# Patient Record
Sex: MALE | Race: WHITE | NOT HISPANIC OR LATINO | Employment: FULL TIME | URBAN - METROPOLITAN AREA
[De-identification: names, ages, dates, MRNs, and addresses within clinical notes are randomized per-mention and may not be internally consistent; named-entity substitution may affect disease eponyms.]

---

## 2021-07-19 ENCOUNTER — OFFICE VISIT (OUTPATIENT)
Dept: SURGERY | Facility: CLINIC | Age: 56
End: 2021-07-19

## 2021-07-19 VITALS
TEMPERATURE: 98.4 F | RESPIRATION RATE: 18 BRPM | BODY MASS INDEX: 31.04 KG/M2 | OXYGEN SATURATION: 99 % | WEIGHT: 229.2 LBS | HEART RATE: 76 BPM | HEIGHT: 72 IN | DIASTOLIC BLOOD PRESSURE: 112 MMHG | SYSTOLIC BLOOD PRESSURE: 181 MMHG

## 2021-07-19 DIAGNOSIS — R10.31 INGUINODYNIA, BILATERAL: Primary | ICD-10-CM

## 2021-07-19 DIAGNOSIS — R10.32 INGUINODYNIA, BILATERAL: Primary | ICD-10-CM

## 2021-07-19 PROCEDURE — 99203 OFFICE O/P NEW LOW 30 MIN: CPT | Performed by: SURGERY

## 2021-07-19 RX ORDER — ATORVASTATIN CALCIUM 40 MG/1
TABLET, FILM COATED ORAL
COMMUNITY
Start: 2021-07-09

## 2021-07-19 RX ORDER — MINOXIDIL 2 %
SOLUTION, NON-ORAL TOPICAL
COMMUNITY

## 2021-07-19 RX ORDER — LISINOPRIL 20 MG/1
TABLET ORAL
COMMUNITY
Start: 2021-07-07

## 2021-07-21 NOTE — PROGRESS NOTES
GENERAL SURGERY CONSULTATION NOTE    Consult requested by: KAREN Tejeda    Patient Care Team:  Linda Martinez MD as PCP - General (Internal Medicine)    Reason for consult: Bilateral inguinal hernias    Subjective     Patient is a 55 y.o. male presents with concerns that he may have a recurrent bilateral inguinal hernia.  He does not report that he has had pain in his groin, but has had some minimal discomfort.  About 9 years ago, the patient underwent a laparoscopic bilateral inguinal hernia repair with open repair of his umbilical hernia.  He has not noticed any bulges, difficulties with urination, having bowel movements, or nausea or vomiting.     Review of Systems   Constitutional: Negative for appetite change, chills and fever.   HENT: Negative for congestion and sore throat.    Respiratory: Negative for cough and shortness of breath.    Cardiovascular: Negative for chest pain and palpitations.   Gastrointestinal: Negative for abdominal pain, constipation, diarrhea, nausea, vomiting and GERD.   Genitourinary: Negative for difficulty urinating, dysuria and frequency.   Musculoskeletal: Negative for arthralgias and back pain.   Skin: Negative for rash and skin lesions.   Neurological: Negative for dizziness, seizures and memory problem.   Hematological: Negative for adenopathy. Does not bruise/bleed easily.   Psychiatric/Behavioral: Negative for sleep disturbance and depressed mood.        History  Past Medical History:   Diagnosis Date   • Hyperlipidemia    • Hypertension    • Inguinal hernia      Past Surgical History:   Procedure Laterality Date   • HERNIA REPAIR  09/2012    becka ing/umb hernia     Family History   Problem Relation Age of Onset   • No Known Problems Mother    • Heart disease Father    • Hypertension Father    • No Known Problems Brother    • No Known Problems Brother      Social History     Tobacco Use   • Smoking status: Never Smoker   • Smokeless tobacco: Never Used   Substance Use Topics   •  Alcohol use: Yes     Alcohol/week: 7.0 standard drinks     Types: 7 Cans of beer per week     Comment: 1-2 a week   • Drug use: Never     (Not in a hospital admission)    Allergies:  Patient has no known allergies.    Objective     Vital Signs       Physical Exam  Vitals reviewed.   Constitutional:       Appearance: He is well-developed.   HENT:      Head: Normocephalic and atraumatic.   Eyes:      Pupils: Pupils are equal, round, and reactive to light.   Cardiovascular:      Rate and Rhythm: Normal rate and regular rhythm.   Pulmonary:      Effort: Pulmonary effort is normal.      Breath sounds: Normal breath sounds.   Abdominal:      General: There is no distension.      Palpations: Abdomen is soft.      Tenderness: There is no abdominal tenderness.      Hernia: No hernia is present. There is no hernia in the left inguinal area or right inguinal area.   Genitourinary:     Penis: Normal and circumcised.       Comments: I did not appreciate an inguinal hernia bilaterally  Musculoskeletal:         General: Normal range of motion.      Cervical back: Normal range of motion.   Lymphadenopathy:      Cervical: No cervical adenopathy.   Skin:     General: Skin is warm and dry.      Findings: No rash.   Neurological:      Mental Status: He is alert and oriented to person, place, and time.         Results Review:   Lab Results (last 24 hours)     ** No results found for the last 24 hours. **        No radiology results for the last day      I reviewed the patient's new imaging results and agree with the interpretation.  I reviewed the patient's other test results and agree with the interpretation    Assessment/Plan     Active Problems:  Bilateral inguinodynia    I do not appreciate an inguinal hernia on either side.  The patient seems to have a well-healed repair of his bilateral inguinal hernias.  The patient is asymptomatic if not minimally symptomatic from his discomfort in his groins, and therefore I would not recommend  any further imaging at this time.  If his pain gets worse, or if he notices a bulge, then I will be happy to see him back in my office for reexamination or would obtain a CT scan of the abdomen and pelvis to rule out occult hernia at that time.  Follow-up as needed    I discussed the patients findings and my recommendations with the patient.     Adithya Madera MD  07/21/21  14:34 EDT

## 2023-08-16 ENCOUNTER — TRANSCRIBE ORDERS (OUTPATIENT)
Dept: ADMINISTRATIVE | Facility: HOSPITAL | Age: 58
End: 2023-08-16
Payer: COMMERCIAL

## 2023-08-16 DIAGNOSIS — R06.09 DYSPNEA ON EXERTION: Primary | ICD-10-CM

## 2023-08-16 DIAGNOSIS — I10 HYPERTENSION, ESSENTIAL: Primary | ICD-10-CM

## 2023-09-29 ENCOUNTER — HOSPITAL ENCOUNTER (OUTPATIENT)
Dept: RESPIRATORY THERAPY | Facility: HOSPITAL | Age: 58
Discharge: HOME | End: 2023-09-29
Payer: COMMERCIAL

## 2023-09-29 ENCOUNTER — HOSPITAL ENCOUNTER (OUTPATIENT)
Dept: CARDIOLOGY | Facility: HOSPITAL | Age: 58
Discharge: HOME | End: 2023-09-29
Payer: COMMERCIAL

## 2023-09-29 VITALS
SYSTOLIC BLOOD PRESSURE: 130 MMHG | DIASTOLIC BLOOD PRESSURE: 75 MMHG | WEIGHT: 229 LBS | HEIGHT: 72 IN | BODY MASS INDEX: 31.02 KG/M2

## 2023-09-29 DIAGNOSIS — R06.09 DYSPNEA ON EXERTION: ICD-10-CM

## 2023-09-29 DIAGNOSIS — I10 HYPERTENSION, ESSENTIAL: ICD-10-CM

## 2023-09-29 LAB
BH CV ECHO MEAS - ACS: 1.78 CM
BH CV ECHO MEAS - AO MAX PG: 11.2 MMHG
BH CV ECHO MEAS - AO MEAN PG: 6.3 MMHG
BH CV ECHO MEAS - AO ROOT DIAM: 3 CM
BH CV ECHO MEAS - AO V2 MAX: 167.3 CM/SEC
BH CV ECHO MEAS - AO V2 VTI: 31.7 CM
BH CV ECHO MEAS - AVA(I,D): 1.66 CM2
BH CV ECHO MEAS - EDV(CUBED): 107.4 ML
BH CV ECHO MEAS - EDV(MOD-SP4): 80.5 ML
BH CV ECHO MEAS - EF(MOD-BP): 56 %
BH CV ECHO MEAS - EF(MOD-SP4): 56.3 %
BH CV ECHO MEAS - ESV(CUBED): 30.4 ML
BH CV ECHO MEAS - ESV(MOD-SP4): 35.2 ML
BH CV ECHO MEAS - FS: 34.4 %
BH CV ECHO MEAS - IVS/LVPW: 1.14 CM
BH CV ECHO MEAS - IVSD: 1.21 CM
BH CV ECHO MEAS - LA DIMENSION: 3.9 CM
BH CV ECHO MEAS - LV MASS(C)D: 199.7 GRAMS
BH CV ECHO MEAS - LV MAX PG: 5.5 MMHG
BH CV ECHO MEAS - LV MEAN PG: 2.7 MMHG
BH CV ECHO MEAS - LV V1 MAX: 117.6 CM/SEC
BH CV ECHO MEAS - LV V1 VTI: 20.8 CM
BH CV ECHO MEAS - LVIDD: 4.8 CM
BH CV ECHO MEAS - LVIDS: 3.1 CM
BH CV ECHO MEAS - LVOT AREA: 2.5 CM2
BH CV ECHO MEAS - LVOT DIAM: 1.8 CM
BH CV ECHO MEAS - LVPWD: 1.06 CM
BH CV ECHO MEAS - MR MAX PG: 52.4 MMHG
BH CV ECHO MEAS - MR MAX VEL: 361.8 CM/SEC
BH CV ECHO MEAS - MV A MAX VEL: 85.2 CM/SEC
BH CV ECHO MEAS - MV DEC SLOPE: 414.5 CM/SEC2
BH CV ECHO MEAS - MV DEC TIME: 0.22 SEC
BH CV ECHO MEAS - MV E MAX VEL: 91.9 CM/SEC
BH CV ECHO MEAS - MV E/A: 1.08
BH CV ECHO MEAS - MV MAX PG: 4.4 MMHG
BH CV ECHO MEAS - MV MEAN PG: 1.66 MMHG
BH CV ECHO MEAS - MV V2 VTI: 31.1 CM
BH CV ECHO MEAS - MVA(VTI): 1.7 CM2
BH CV ECHO MEAS - PA ACC TIME: 0.12 SEC
BH CV ECHO MEAS - PA V2 MAX: 118.3 CM/SEC
BH CV ECHO MEAS - PULM A REVS DUR: 0.1 SEC
BH CV ECHO MEAS - PULM A REVS VEL: 34.8 CM/SEC
BH CV ECHO MEAS - PULM DIAS VEL: 41.6 CM/SEC
BH CV ECHO MEAS - PULM S/D: 1.48
BH CV ECHO MEAS - PULM SYS VEL: 61.4 CM/SEC
BH CV ECHO MEAS - RAP SYSTOLE: 3 MMHG
BH CV ECHO MEAS - RV MAX PG: 3.3 MMHG
BH CV ECHO MEAS - RV V1 MAX: 90.4 CM/SEC
BH CV ECHO MEAS - RV V1 VTI: 20.4 CM
BH CV ECHO MEAS - RVDD: 3.3 CM
BH CV ECHO MEAS - RVSP: 16.2 MMHG
BH CV ECHO MEAS - SV(LVOT): 52.8 ML
BH CV ECHO MEAS - SV(MOD-SP4): 45.3 ML
BH CV ECHO MEAS - TR MAX PG: 13.2 MMHG
BH CV ECHO MEAS - TR MAX VEL: 180.5 CM/SEC

## 2023-09-29 PROCEDURE — 93017 CV STRESS TEST TRACING ONLY: CPT

## 2023-09-29 PROCEDURE — 93306 TTE W/DOPPLER COMPLETE: CPT

## 2023-09-30 LAB
BH CV STRESS BP STAGE 1: NORMAL
BH CV STRESS BP STAGE 2: NORMAL
BH CV STRESS BP STAGE 3: NORMAL
BH CV STRESS BP STAGE 4: NORMAL
BH CV STRESS DURATION MIN STAGE 1: 3
BH CV STRESS DURATION MIN STAGE 2: 3
BH CV STRESS DURATION MIN STAGE 3: 3
BH CV STRESS DURATION MIN STAGE 4: 0
BH CV STRESS DURATION SEC STAGE 1: 0
BH CV STRESS DURATION SEC STAGE 2: 0
BH CV STRESS DURATION SEC STAGE 3: 0
BH CV STRESS DURATION SEC STAGE 4: 25
BH CV STRESS GRADE STAGE 1: 10
BH CV STRESS GRADE STAGE 2: 12
BH CV STRESS GRADE STAGE 3: 14
BH CV STRESS GRADE STAGE 4: 16
BH CV STRESS HR STAGE 1: 105
BH CV STRESS HR STAGE 2: 122
BH CV STRESS HR STAGE 3: 144
BH CV STRESS HR STAGE 4: 158
BH CV STRESS METS STAGE 1: 5
BH CV STRESS METS STAGE 2: 7.5
BH CV STRESS METS STAGE 3: 10
BH CV STRESS METS STAGE 4: 13.5
BH CV STRESS PROTOCOL 1: NORMAL
BH CV STRESS RECOVERY BP: NORMAL MMHG
BH CV STRESS RECOVERY HR: 89 BPM
BH CV STRESS SPEED STAGE 1: 1.7
BH CV STRESS SPEED STAGE 2: 2.5
BH CV STRESS SPEED STAGE 3: 3.4
BH CV STRESS SPEED STAGE 4: 4.2
BH CV STRESS STAGE 1: 1
BH CV STRESS STAGE 2: 2
BH CV STRESS STAGE 3: 3
BH CV STRESS STAGE 4: 4
MAXIMAL PREDICTED HEART RATE: 164 BPM
PERCENT MAX PREDICTED HR: 96.95 %
STRESS BASELINE BP: NORMAL MMHG
STRESS BASELINE HR: 72 BPM
STRESS PERCENT HR: 114 %
STRESS POST ESTIMATED WORKLOAD: 10.1 METS
STRESS POST EXERCISE DUR MIN: 7 MIN
STRESS POST EXERCISE DUR SEC: 31 SEC
STRESS POST PEAK BP: NORMAL MMHG
STRESS POST PEAK HR: 159 BPM
STRESS TARGET HR: 139 BPM

## 2024-02-21 ENCOUNTER — OFFICE VISIT (OUTPATIENT)
Dept: CARDIOLOGY | Facility: CLINIC | Age: 59
End: 2024-02-21
Payer: COMMERCIAL

## 2024-02-21 VITALS
BODY MASS INDEX: 30.91 KG/M2 | HEART RATE: 70 BPM | OXYGEN SATURATION: 96 % | RESPIRATION RATE: 16 BRPM | WEIGHT: 228.2 LBS | HEIGHT: 72 IN | SYSTOLIC BLOOD PRESSURE: 144 MMHG | DIASTOLIC BLOOD PRESSURE: 84 MMHG

## 2024-02-21 DIAGNOSIS — F10.90 ALCOHOL USE DISORDER: ICD-10-CM

## 2024-02-21 DIAGNOSIS — R94.39 ABNORMAL STRESS TEST: ICD-10-CM

## 2024-02-21 DIAGNOSIS — G89.29 CHRONIC CHEST PAIN WITH LOW TO MODERATE RISK FOR CAD: Primary | ICD-10-CM

## 2024-02-21 DIAGNOSIS — E78.5 DYSLIPIDEMIA: ICD-10-CM

## 2024-02-21 DIAGNOSIS — R07.9 CHRONIC CHEST PAIN WITH LOW TO MODERATE RISK FOR CAD: Primary | ICD-10-CM

## 2024-02-21 DIAGNOSIS — I10 ESSENTIAL HYPERTENSION: ICD-10-CM

## 2024-02-21 DIAGNOSIS — Z91.89 CHRONIC CHEST PAIN WITH LOW TO MODERATE RISK FOR CAD: Primary | ICD-10-CM

## 2024-02-21 PROCEDURE — 99204 OFFICE O/P NEW MOD 45 MIN: CPT | Performed by: STUDENT IN AN ORGANIZED HEALTH CARE EDUCATION/TRAINING PROGRAM

## 2024-02-21 PROCEDURE — 93000 ELECTROCARDIOGRAM COMPLETE: CPT | Performed by: STUDENT IN AN ORGANIZED HEALTH CARE EDUCATION/TRAINING PROGRAM

## 2024-02-21 RX ORDER — ASPIRIN 325 MG
325 TABLET ORAL DAILY
COMMUNITY
End: 2024-02-21

## 2024-02-21 RX ORDER — ASPIRIN 81 MG/1
81 TABLET ORAL DAILY
Qty: 90 TABLET | Refills: 3 | Status: SHIPPED | OUTPATIENT
Start: 2024-02-21

## 2024-02-21 RX ORDER — ASPIRIN 81 MG/1
81 TABLET, CHEWABLE ORAL DAILY
Status: DISCONTINUED | OUTPATIENT
Start: 2024-02-21 | End: 2024-02-21

## 2024-02-21 RX ORDER — ALLOPURINOL 300 MG/1
TABLET ORAL
COMMUNITY
Start: 2024-01-20

## 2024-02-21 RX ORDER — METFORMIN HYDROCHLORIDE 500 MG/1
TABLET, EXTENDED RELEASE ORAL
COMMUNITY
Start: 2024-01-22

## 2024-02-21 NOTE — PROGRESS NOTES
"      Subjective:     Encounter Date:02/21/2024      Patient ID: Bulmaro Reyes is a 58 y.o. male.    Chief Complaint:  Chief Complaint   Patient presents with    Consult     Abnormal Stress Test       HPI:    58-year-old gentleman with a past medical history of hypertension, EtOH use disorder, dyslipidemia, presents to establish care  Mentions episode of dizziness which prompted echocardiogram which showed normal ejection fraction and no significant valve disease.  Treadmill stress test which showed evidence of myocardial ischemia on ECG with a blood pressure in the 190s.  Patient reports no chest pain or dyspnea on exertion.  He mentions compliance with all of his medications.  He is taking aspirin 325 mg 3 times a day.  Given patient is asymptomatic with abnormal treadmill stress test with a high blood pressure, will evaluate further with coronary CT angiogram.  Advised to change aspirin dose to 81 mg daily.      Objective:         /84 (BP Location: Right arm, Patient Position: Sitting, Cuff Size: Large Adult)   Pulse 70   Resp 16   Ht 182.9 cm (72\")   Wt 104 kg (228 lb 3.2 oz)   SpO2 96%   BMI 30.95 kg/m²     Physical exam  General-no acute distress  CVS-S1-S2 normal, no murmur  Respiratory-clear breath sounds  GI-soft nontender abdomen  No pedal edema  Alert oriented X3    ROS:  14 point review of systems negative except as mentioned above    Current Outpatient Medications:     allopurinol (ZYLOPRIM) 300 MG tablet, , Disp: , Rfl:     atorvastatin (LIPITOR) 40 MG tablet, , Disp: , Rfl:     lisinopril (PRINIVIL,ZESTRIL) 20 MG tablet, , Disp: , Rfl:     metFORMIN ER (GLUCOPHAGE-XR) 500 MG 24 hr tablet, , Disp: , Rfl:     Omega-3 Fatty Acids ([employer] Fish Oil) 1200 MG capsule, Take  by mouth., Disp: , Rfl:     aspirin 81 MG EC tablet, Take 1 tablet by mouth Daily., Disp: 90 tablet, Rfl: 3  No current facility-administered medications for this visit.    Problem List:  There is no problem list on file for " this patient.    Past Medical History:  Past Medical History:   Diagnosis Date    Hyperlipidemia     Hypertension     Inguinal hernia      Past Surgical History:  Past Surgical History:   Procedure Laterality Date    HERNIA REPAIR  09/2012    becka ing/umb hernia     Social History:  Social History     Socioeconomic History    Marital status:    Tobacco Use    Smoking status: Never    Smokeless tobacco: Never   Substance and Sexual Activity    Alcohol use: Yes     Alcohol/week: 7.0 standard drinks of alcohol     Types: 7 Cans of beer per week     Comment: 1-2 a week    Drug use: Never    Sexual activity: Defer     Allergies:  No Known Allergies  Immunizations:    There is no immunization history on file for this patient.         In-Office Procedure(s):  No orders to display        ASCVD RIsk Score::  The ASCVD Risk score ([place] DK, et al., 2019) failed to calculate for the following reasons:    Cannot find a previous HDL lab    Cannot find a previous total cholesterol lab    Imaging:                   Most recent EKG as reviewed and interpreted by me:    ECG 12 Lead    Date/Time: 2/21/2024 12:26 PM  Performed by: Garrett Graves MD    Authorized by: Garrett Graves MD  Comparison: not compared with previous ECG   Previous ECG: no previous ECG available  Rhythm: sinus rhythm  Other findings: non-specific ST-T wave changes           Most recent echo as reviewed and interpreted by me:  Results for orders placed during the hospital encounter of 09/29/23    Adult Transthoracic Echo Complete W/ Cont if Necessary Per Protocol    Interpretation Summary    Left ventricular systolic function is normal. Calculated left ventricular EF = 56%    Left ventricular diastolic function was normal.    The right ventricular cavity is mildly dilated.    Estimated right ventricular systolic pressure from tricuspid regurgitation is normal (<35 mmHg).      Most recent stress test as reviewed and interpreted by me:  Results for orders  placed during the hospital encounter of 09/29/23    Treadmill stress test    Interpretation Summary    ECG evidence of myocardial ischemia.    Negative clinical evidence of myocardial ischemia.    Findings consistent with an abnormal ECG stress test.    Patient exercised for 7 minutes and 31 seconds achieving an estimated workload of 10.1 METS.  Peak blood pressure 196/88.  Peak heart rate of 159 bpm.            Assessment & Plan :         Abnormal treadmill stress test  Normal functional capacity for age and gender  ECG changes seen at stress concerning for myocardial ischemia  Given patient is asymptomatic from chest pain perspective, will evaluate further with coronary CT angiogram    Hypertension  Mildly elevated blood pressure today  Continue lisinopril 20 g daily  Consider adding low-dose calcium channel blocker on follow-up    Obesity  BMI 30  Prediabetic  Metformin as per PCP    Dyslipidemia  Significant improvement in LDL with Lipitor 40 mg daily at bedtime  Continues to have high triglyceride level, counseled on weight loss, consider Vascepa based on coronary CT angiogram          Part of this note may be an electronic transcription/translation of spoken language to printed text using the Dragon Dictation System.    Garrett Graves MD  02/21/24  .

## 2024-02-22 ENCOUNTER — PATIENT ROUNDING (BHMG ONLY) (OUTPATIENT)
Dept: CARDIOLOGY | Facility: CLINIC | Age: 59
End: 2024-02-22
Payer: COMMERCIAL

## 2024-02-22 NOTE — PROGRESS NOTES
A My-Chart message has been sent to the patient for [facility] [place] [facility]

## 2024-02-26 RX ORDER — METOPROLOL TARTRATE 50 MG/1
50 TABLET, FILM COATED ORAL 2 TIMES DAILY
Qty: 4 TABLET | Refills: 0 | Status: SHIPPED | OUTPATIENT
Start: 2024-02-26 | End: 2024-02-26 | Stop reason: SDUPTHER

## 2024-02-26 RX ORDER — METOPROLOL TARTRATE 50 MG/1
50 TABLET, FILM COATED ORAL 2 TIMES DAILY
Qty: 4 TABLET | Refills: 0 | Status: SHIPPED | OUTPATIENT
Start: 2024-02-26

## 2024-03-07 ENCOUNTER — TELEPHONE (OUTPATIENT)
Dept: CARDIOLOGY | Facility: CLINIC | Age: 59
End: 2024-03-07

## 2024-03-07 NOTE — TELEPHONE ENCOUNTER
Caller: Bulmaro Reyes    Relationship: Self    Best call back number: [phone]    Caller requesting test results:  CARDIO CT    What test was performed: CARDIO CT    When was the test performed: 2/29/24    Where was the test performed: [facility]     Additional notes: PLEASE REACH OUT TO PATIENT TO REVIEW TEST RESULTS,

## 2024-03-08 NOTE — TELEPHONE ENCOUNTER
Caller: Bulmaro Reyes    Relationship to patient: Self    Best call back number: [phone]    Patient is needing: PATIENT STATED THAT HE HAD CALLED YESTERDAY ABOUT GETTING RESULTS FOR CARDIO CT SCAN AND PATIENT WOULD LIKE TO KNOW IF OFFICE HAS RECEIVED THE RECORDS. PLEASE CONTACT PATIENT TO ADVISE.

## 2024-03-11 DIAGNOSIS — I25.118 CORONARY ARTERY DISEASE OF NATIVE ARTERY OF NATIVE HEART WITH STABLE ANGINA PECTORIS: Primary | ICD-10-CM

## 2024-03-12 ENCOUNTER — HOSPITAL ENCOUNTER (OUTPATIENT)
Facility: HOSPITAL | Age: 59
Setting detail: HOSPITAL OUTPATIENT SURGERY
Discharge: HOME | End: 2024-03-12
Attending: INTERNAL MEDICINE | Admitting: INTERNAL MEDICINE
Payer: COMMERCIAL

## 2024-03-12 VITALS
HEART RATE: 70 BPM | SYSTOLIC BLOOD PRESSURE: 142 MMHG | TEMPERATURE: 97.8 F | WEIGHT: 212.3 LBS | BODY MASS INDEX: 28.76 KG/M2 | OXYGEN SATURATION: 100 % | HEIGHT: 72 IN | RESPIRATION RATE: 12 BRPM | DIASTOLIC BLOOD PRESSURE: 77 MMHG

## 2024-03-12 DIAGNOSIS — I25.118 CORONARY ARTERY DISEASE OF NATIVE ARTERY OF NATIVE HEART WITH STABLE ANGINA PECTORIS: ICD-10-CM

## 2024-03-12 LAB
ANION GAP SERPL CALCULATED.3IONS-SCNC: 16 MMOL/L (ref 5–15)
APTT PPP: 27.8 SECONDS (ref 24–31)
BUN SERPL-MCNC: 33 MG/DL (ref 6–20)
BUN/CREAT SERPL: 24.6 (ref 7–25)
CALCIUM SPEC-SCNC: 10.2 MG/DL (ref 8.6–10.5)
CHLORIDE SERPL-SCNC: 94 MMOL/L (ref 98–107)
CO2 SERPL-SCNC: 26 MMOL/L (ref 22–29)
CREAT SERPL-MCNC: 1.34 MG/DL (ref 0.76–1.27)
DEPRECATED RDW RBC AUTO: 50.3 FL (ref 37–54)
EGFRCR SERPLBLD CKD-EPI 2021: 62.2 ML/MIN/1.73
ERYTHROCYTE [DISTWIDTH] IN BLOOD BY AUTOMATED COUNT: 15.5 % (ref 12.3–15.4)
GLUCOSE SERPL-MCNC: 93 MG/DL (ref 65–99)
HCT VFR BLD AUTO: 40.6 % (ref 37.5–51)
HGB BLD-MCNC: 13.7 G/DL (ref 13–17.7)
INR PPP: 1.08 (ref 0.93–1.1)
MCH RBC QN AUTO: 30 PG (ref 26.6–33)
MCHC RBC AUTO-ENTMCNC: 33.6 G/DL (ref 31.5–35.7)
MCV RBC AUTO: 89.2 FL (ref 79–97)
PLATELET # BLD AUTO: 380 10*3/MM3 (ref 140–450)
PMV BLD AUTO: 8.3 FL (ref 6–12)
POTASSIUM SERPL-SCNC: 3.8 MMOL/L (ref 3.5–5.2)
PROTHROMBIN TIME: 11.7 SECONDS (ref 9.6–11.7)
RBC # BLD AUTO: 4.56 10*6/MM3 (ref 4.14–5.8)
SODIUM SERPL-SCNC: 136 MMOL/L (ref 136–145)
WBC NRBC COR # BLD AUTO: 13 10*3/MM3 (ref 3.4–10.8)

## 2024-03-12 PROCEDURE — 25510000001 IOPAMIDOL PER 1 ML: Performed by: INTERNAL MEDICINE

## 2024-03-12 PROCEDURE — 25810000003 SODIUM CHLORIDE 0.9 % SOLUTION: Performed by: INTERNAL MEDICINE

## 2024-03-12 PROCEDURE — 93458 L HRT ARTERY/VENTRICLE ANGIO: CPT | Performed by: INTERNAL MEDICINE

## 2024-03-12 PROCEDURE — 85610 PROTHROMBIN TIME: CPT | Performed by: STUDENT IN AN ORGANIZED HEALTH CARE EDUCATION/TRAINING PROGRAM

## 2024-03-12 PROCEDURE — 80048 BASIC METABOLIC PNL TOTAL CA: CPT | Performed by: STUDENT IN AN ORGANIZED HEALTH CARE EDUCATION/TRAINING PROGRAM

## 2024-03-12 PROCEDURE — C1760 CLOSURE DEV, VASC: HCPCS | Performed by: INTERNAL MEDICINE

## 2024-03-12 PROCEDURE — 85027 COMPLETE CBC AUTOMATED: CPT | Performed by: STUDENT IN AN ORGANIZED HEALTH CARE EDUCATION/TRAINING PROGRAM

## 2024-03-12 PROCEDURE — C1894 INTRO/SHEATH, NON-LASER: HCPCS | Performed by: INTERNAL MEDICINE

## 2024-03-12 PROCEDURE — 85730 THROMBOPLASTIN TIME PARTIAL: CPT | Performed by: STUDENT IN AN ORGANIZED HEALTH CARE EDUCATION/TRAINING PROGRAM

## 2024-03-12 PROCEDURE — 25010000002 MIDAZOLAM PER 1 MG: Performed by: INTERNAL MEDICINE

## 2024-03-12 PROCEDURE — 25010000002 FENTANYL CITRATE (PF) 100 MCG/2ML SOLUTION: Performed by: INTERNAL MEDICINE

## 2024-03-12 PROCEDURE — C1769 GUIDE WIRE: HCPCS | Performed by: INTERNAL MEDICINE

## 2024-03-12 RX ORDER — SODIUM CHLORIDE 9 MG/ML
100 INJECTION, SOLUTION INTRAVENOUS ONCE
Status: COMPLETED | OUTPATIENT
Start: 2024-03-12 | End: 2024-03-12

## 2024-03-12 RX ORDER — FENTANYL CITRATE 50 UG/ML
INJECTION, SOLUTION INTRAMUSCULAR; INTRAVENOUS
Status: DISCONTINUED | OUTPATIENT
Start: 2024-03-12 | End: 2024-03-12 | Stop reason: HOSPADM

## 2024-03-12 RX ORDER — DIPHENHYDRAMINE HCL 25 MG
25 CAPSULE ORAL EVERY 6 HOURS PRN
Status: DISCONTINUED | OUTPATIENT
Start: 2024-03-12 | End: 2024-03-12 | Stop reason: HOSPADM

## 2024-03-12 RX ORDER — ALUMINA, MAGNESIA, AND SIMETHICONE 2400; 2400; 240 MG/30ML; MG/30ML; MG/30ML
15 SUSPENSION ORAL EVERY 6 HOURS PRN
Status: DISCONTINUED | OUTPATIENT
Start: 2024-03-12 | End: 2024-03-12 | Stop reason: HOSPADM

## 2024-03-12 RX ORDER — LIDOCAINE HYDROCHLORIDE 20 MG/ML
INJECTION, SOLUTION INFILTRATION; PERINEURAL
Status: DISCONTINUED | OUTPATIENT
Start: 2024-03-12 | End: 2024-03-12 | Stop reason: HOSPADM

## 2024-03-12 RX ORDER — MIDAZOLAM HYDROCHLORIDE 1 MG/ML
INJECTION INTRAMUSCULAR; INTRAVENOUS
Status: DISCONTINUED | OUTPATIENT
Start: 2024-03-12 | End: 2024-03-12 | Stop reason: HOSPADM

## 2024-03-12 RX ORDER — ONDANSETRON 2 MG/ML
4 INJECTION INTRAMUSCULAR; INTRAVENOUS EVERY 6 HOURS PRN
Status: DISCONTINUED | OUTPATIENT
Start: 2024-03-12 | End: 2024-03-12 | Stop reason: HOSPADM

## 2024-03-12 RX ORDER — SODIUM CHLORIDE 9 MG/ML
250 INJECTION, SOLUTION INTRAVENOUS ONCE AS NEEDED
Status: DISCONTINUED | OUTPATIENT
Start: 2024-03-12 | End: 2024-03-12 | Stop reason: HOSPADM

## 2024-03-12 RX ORDER — ONDANSETRON 4 MG/1
4 TABLET, ORALLY DISINTEGRATING ORAL EVERY 6 HOURS PRN
Status: DISCONTINUED | OUTPATIENT
Start: 2024-03-12 | End: 2024-03-12 | Stop reason: HOSPADM

## 2024-03-12 RX ORDER — NITROGLYCERIN 0.4 MG/1
0.4 TABLET SUBLINGUAL
Status: DISCONTINUED | OUTPATIENT
Start: 2024-03-12 | End: 2024-03-12 | Stop reason: HOSPADM

## 2024-03-12 RX ADMIN — SODIUM CHLORIDE 250 ML: 9 INJECTION, SOLUTION INTRAVENOUS at 15:23

## 2024-03-12 RX ADMIN — Medication 1200 MG: at 15:21

## 2024-03-12 RX ADMIN — SODIUM CHLORIDE 100 ML/HR: 9 INJECTION, SOLUTION INTRAVENOUS at 15:21

## 2024-03-12 NOTE — DISCHARGE INSTRUCTIONS
[facility]  Cardiology  [facility] [place] [address] [phone]    Coronary Angiogram (Femoral Approach) After Care     Refer to this sheet in the next few weeks. These instructions provide you with information on caring for yourself after your procedure. Your health care provider may also give you more specific instructions. Your treatment has been planned according to current medical practices, but problems sometimes occur. Call your health care provider if you have any problems or questions after your procedure.      What to Expect After the Procedure:  After your procedure, it is typical to have the following sensations:  Minor discomfort or tenderness and a small bump at the catheter insertion site. The bump should usually decrease in size and tenderness within 1 to 2 weeks.  Any bruising will usually fade within 2 to 4 weeks.    Home Care Instructions:  Do not apply powder or lotion to the site.  Do not take baths, swim, or use a hot tub until your health care provider approves and the site is completely healed.  Do not bend, squat, or lift anything over 20 lb (9 kg) or as directed by your health care provider. However, we recommend lifting nothing heavier than a gallon of milk.    You may shower 24 hours after the procedure. Remove the bandage (dressing) and gently wash the site with plain soap and water. Gently pat the site dry. You may apply a band aid daily for 2 days if desired.    Inspect the site at least twice daily.  Increase your fluid intake for the next 2 days.    Limit your activity for the first 48 hours. .    Avoid strenuous activity for 1 week or as advised by your physician.    Follow instructions about when you can drive or return to work as directed by your physician.    Hold direct pressure over the site when you cough, sneeze, laugh or change positions.  Do this for the next 2 days.    Do not operate machinery or power tools for 24 hours.  A responsible adult should  be with you for the first 24 hours after you arrive home. Do not make any important legal decisions or sign legal papers for 24 hours.  Do not drink alcohol for 24 hours.  Metformin or any medications containing Metformin should not be taken for 48 hours after your procedure.      Call Your Doctor If:  You have drainage (other than a small amount of blood on the dressing).  You have chills or a fever > 101.  You have redness, warmth, swelling(larger than a walnut), or pain at the insertion site  You develop chest pain or shortness of breath, feel faint, or pass out.  You develop pain, discoloration, coldness, numbness, tingling, or severe bruising in the leg that held the catheter.  You develop bleeding from any other place, such as the bowels.  You have heavy bleeding from the site, hold pressure on the site for 20 minutes.  If the bleeding stops, apply a fresh bandage and call your cardiologist.  However, if you continue to have bleeding, call 911.  You have any symptoms of a stroke.  Remember BE FAST  B-balance. Sudden trouble walking or loss of balance.  E-eyes.  Sudden changes in how you see or a sudden onset of a very bad headache.   F-face. Sudden weakness or loss of feeling of the face or facial droop on one side.   A-arms Sudden weakness or numbness in one arm.  One arm drifts down if they are both held out in front of you. This happens suddenly and usually on one side of the body.  S-speech.  Sudden trouble speaking, slurred speech or trouble understanding what people are saying.   T-time  Time to call emergency services.  Write down the symptoms and the time they started.        Make Sure You:  Understand these instructions.  Will watch your condition.  Will get help right away if you are not doing well or get worse.

## 2024-04-08 ENCOUNTER — OFFICE VISIT (OUTPATIENT)
Dept: CARDIOLOGY | Facility: CLINIC | Age: 59
End: 2024-04-08
Payer: COMMERCIAL

## 2024-04-08 VITALS
OXYGEN SATURATION: 97 % | SYSTOLIC BLOOD PRESSURE: 113 MMHG | WEIGHT: 210 LBS | RESPIRATION RATE: 18 BRPM | HEART RATE: 71 BPM | HEIGHT: 72 IN | DIASTOLIC BLOOD PRESSURE: 75 MMHG | BODY MASS INDEX: 28.44 KG/M2

## 2024-04-08 DIAGNOSIS — I25.118 CORONARY ARTERY DISEASE OF NATIVE ARTERY OF NATIVE HEART WITH STABLE ANGINA PECTORIS: Primary | ICD-10-CM

## 2024-04-08 DIAGNOSIS — E78.5 DYSLIPIDEMIA: ICD-10-CM

## 2024-04-08 PROCEDURE — 93000 ELECTROCARDIOGRAM COMPLETE: CPT | Performed by: STUDENT IN AN ORGANIZED HEALTH CARE EDUCATION/TRAINING PROGRAM

## 2024-04-08 PROCEDURE — 99214 OFFICE O/P EST MOD 30 MIN: CPT | Performed by: STUDENT IN AN ORGANIZED HEALTH CARE EDUCATION/TRAINING PROGRAM

## 2024-04-08 RX ORDER — ATORVASTATIN CALCIUM 40 MG/1
80 TABLET, FILM COATED ORAL NIGHTLY
Qty: 90 TABLET | Refills: 3 | Status: SHIPPED | OUTPATIENT
Start: 2024-04-08

## 2024-04-08 NOTE — PROGRESS NOTES
"      Subjective:     Encounter Date:04/08/2024      Patient ID: Bulmaro Reyes is a 58 y.o. male.    Chief Complaint:  Chief Complaint   Patient presents with    Follow-up       HPI:    58-year-old gentleman with a past medical history of hypertension, EtOH use disorder, dyslipidemia, presents to establish care  Mentions episode of dizziness which prompted echocardiogram which showed normal ejection fraction and no significant valve disease.  Treadmill stress test which showed evidence of myocardial ischemia on ECG with a blood pressure in the 190s.  Patient reports no chest pain or dyspnea on exertion.  He mentions compliance with all of his medications.  He is taking aspirin 325 mg 3 times a day.  Given patient is asymptomatic with abnormal treadmill stress test with a high blood pressure, will evaluate further with coronary CT angiogram.  Advised to change aspirin dose to 81 mg daily.     4/8/2024-coronary CT angiogram showing heavy calcification, diffuse severe three-vessel disease, subsequent coronary angiogram with Dr. Hua showing severe calcification with diffuse disease however no high-grade stenosis noted.  Plan for medical management.  Patient denies any chest pain, dyspnea on exertion, trying to eat healthier and losing weight.      Objective:         /75 (BP Location: Left arm, Patient Position: Sitting, Cuff Size: Large Adult)   Pulse 71   Resp 18   Ht 182.9 cm (72\")   Wt 95.3 kg (210 lb)   SpO2 97%   BMI 28.48 kg/m²     Physical exam  General-no acute distress  CVS-S1-S2 normal, no murmur  Respiratory-clear breath sounds  GI-soft nontender abdomen  No pedal edema  Alert oriented X3    ROS:  14 point review of systems negative except as mentioned above    Current Outpatient Medications:     allopurinol (ZYLOPRIM) 300 MG tablet, , Disp: , Rfl:     aspirin 81 MG EC tablet, Take 1 tablet by mouth Daily., Disp: 90 tablet, Rfl: 3    atorvastatin (LIPITOR) 40 MG tablet, Take 2 tablets by mouth " Every Night., Disp: 90 tablet, Rfl: 3    lisinopril (PRINIVIL,ZESTRIL) 20 MG tablet, , Disp: , Rfl:     metFORMIN ER (GLUCOPHAGE-XR) 500 MG 24 hr tablet, , Disp: , Rfl:     metoprolol tartrate (LOPRESSOR) 50 MG tablet, Take 1 tablet by mouth 2 (Two) Times a Day., Disp: 4 tablet, Rfl: 0    Omega-3 Fatty Acids ([employer] Fish Oil) 1200 MG capsule, Take 2 capsules by mouth Daily., Disp: , Rfl:     Problem List:  Patient Active Problem List   Diagnosis    Abnormal stress test    Alcohol use disorder    Essential hypertension    Dyslipidemia    Coronary artery disease of native artery of native heart with stable angina pectoris     Past Medical History:  Past Medical History:   Diagnosis Date    Hyperlipidemia     Hypertension     Inguinal hernia      Past Surgical History:  Past Surgical History:   Procedure Laterality Date    CARDIAC CATHETERIZATION N/A 3/12/2024    Procedure: Left Heart Cath;  Surgeon: Jayesh Hua MD;  Location: [facility] CATH INVASIVE LOCATION;  Service: Cardiovascular;  Laterality: N/A;    HERNIA REPAIR  09/2012    becka ing/umb hernia     Social History:  Social History     Socioeconomic History    Marital status:    Tobacco Use    Smoking status: Never    Smokeless tobacco: Never   Vaping Use    Vaping status: Never Used   Substance and Sexual Activity    Alcohol use: Yes     Alcohol/week: 7.0 standard drinks of alcohol     Types: 7 Cans of beer per week     Comment: 1-2 a week    Drug use: Never    Sexual activity: Defer     Allergies:  No Known Allergies  Immunizations:    There is no immunization history on file for this patient.         In-Office Procedure(s):  No orders to display        ASCVD RIsk Score::  The ASCVD Risk score ([place] DK, et al., 2019) failed to calculate for the following reasons:    The patient has a prior MI or stroke diagnosis    Imaging:    Results for orders placed in visit on 02/29/24    IMAGING SCANNED                 Most recent EKG as reviewed and interpreted by  me:    ECG 12 Lead    Date/Time: 4/8/2024 12:33 PM  Performed by: Garrett Graves MD    Authorized by: Garrett Graves MD  Comparison: compared with previous ECG   Similar to previous ECG  Rhythm: sinus rhythm  Other findings: non-specific ST-T wave changes           Most recent echo as reviewed and interpreted by me:  Results for orders placed during the hospital encounter of 09/29/23    Adult Transthoracic Echo Complete W/ Cont if Necessary Per Protocol    Interpretation Summary    Left ventricular systolic function is normal. Calculated left ventricular EF = 56%    Left ventricular diastolic function was normal.    The right ventricular cavity is mildly dilated.    Estimated right ventricular systolic pressure from tricuspid regurgitation is normal (<35 mmHg).      Most recent stress test as reviewed and interpreted by me:  Results for orders placed during the hospital encounter of 09/29/23    Treadmill stress test    Interpretation Summary    ECG evidence of myocardial ischemia.    Negative clinical evidence of myocardial ischemia.    Findings consistent with an abnormal ECG stress test.    Patient exercised for 7 minutes and 31 seconds achieving an estimated workload of 10.1 METS.  Peak blood pressure 196/88.  Peak heart rate of 159 bpm.      Most recent cardiac catheterization as reviewed interpreted by me:  Results for orders placed during the hospital encounter of 03/12/24    Cardiac Catheterization/Vascular Study    Conclusion  CARDIAC CATHETERIZATION REPORT      DATE OF PROCEDURE:  3/12/2024    INDICATION FOR PROCEDURE:    Abnormal CT angiography of coronary arteries  Abnormal stress test  Hypertension  Borderline diabetes mellitus    PROCEDURE PERFORMED:    Ultrasound-guided access of femoral artery  Left heart catheterization  coronary angiography  left ventriculography    PROCEDURE COMMENTS:    After informed consent was obtained, the patient was prepped and draped in the usual sterile manner.  Mild to  moderate sedation was administered.  Right femoral artery was accessed without difficulty under fluoroscopy and ultrasound guidance and 6 Chinese arterial sheath was inserted.  Sheath was flushed with heparinized saline.    Using 6 Chinese Eli catheters, first left coronary artery and the right coronary was electively engaged and appropriate views were taken.  A 6 Chinese JR4 catheter was used to cross aortic valve and left heart catheterization was performed.  Left ventriculography was done in a GAMA view    Perclose was used to close femoral artery artery.    Patient tolerated the procedure well.    FINDINGS:    1. HEMODYNAMICS:    Aortic pressure: 124/72 mmHg    LVEDP: 0 to 5 mmHg    Gradient across aortic valve on pullback: No gradient across aortic valve      2. LEFT VENTRICULOGRAPHY: 60 to 65%      3. CORONARY ANGIOGRAPHY:    A: Left main coronary artery: Mild to moderately calcified with mild disease up to 10 to 20% in left main.    B: Left anterior descending artery: 30 to 40% stenosis at the ostium of the left anterior descending artery.  Mid LAD has 20 to 30% stenosis.  Mid to distal LAD is diffusely diseased and tapers into a small caliber vessel.    C: Left circumflex coronary artery: LCx is also mild to moderately calcified.  There is diffuse disease in mid to distal segment.  Mid obtuse marginal divides into superior and inferior branch.  Superior branch has 40 to 50% stenosis at the ostium.    D: Right coronary artery: RCA is large and dominant and has 40 to 50% stenosis in the midsegment    SUMMARY:    Mild to moderate diffuse three-vessel coronary artery disease  Preserved left ventricular function    RECOMMENDATIONS:    Aggressive risk factor modification  Medical treatment      Assessment & Plan :         Multivessel coronary artery disease, mild to moderate, nonobstructive  Heavy calcification  Patient is rather asymptomatic  Increase Lipitor to 80 mg daily at bedtime for goal LDL less than  70  Goal triglycerides should be less than 100, introduce Vascepa on follow-up  Continue baby aspirin  Continue metoprolol 50 mg twice daily  Continue lisinopril 20 mg daily    Hypertension  Blood pressure is normal today  Regular aerobic exercise  DASH diet  Continue metoprolol and lisinopril    Dyslipidemia  Hypertriglyceridemia  Increase Lipitor as above  Add Vascepa on follow-up    Follow-up with Dr. Bush    Part of this note may be an electronic transcription/translation of spoken language to printed text using the Dragon Dictation System.    Garrett Graves MD  04/08/24  .

## 2024-05-30 ENCOUNTER — HOSPITAL ENCOUNTER (OUTPATIENT)
Dept: CT IMAGING | Facility: HOSPITAL | Age: 59
End: 2024-05-30
Payer: COMMERCIAL

## 2024-10-01 ENCOUNTER — OFFICE VISIT (OUTPATIENT)
Dept: CARDIOLOGY | Facility: CLINIC | Age: 59
End: 2024-10-01
Payer: COMMERCIAL

## 2024-10-01 VITALS
HEART RATE: 59 BPM | WEIGHT: 207 LBS | HEIGHT: 72 IN | RESPIRATION RATE: 18 BRPM | SYSTOLIC BLOOD PRESSURE: 126 MMHG | DIASTOLIC BLOOD PRESSURE: 82 MMHG | BODY MASS INDEX: 28.04 KG/M2

## 2024-10-01 DIAGNOSIS — I10 ESSENTIAL HYPERTENSION: ICD-10-CM

## 2024-10-01 DIAGNOSIS — R94.39 ABNORMAL STRESS TEST: ICD-10-CM

## 2024-10-01 DIAGNOSIS — R07.9 CHRONIC CHEST PAIN WITH LOW TO MODERATE RISK FOR CAD: ICD-10-CM

## 2024-10-01 DIAGNOSIS — G89.29 CHRONIC CHEST PAIN WITH LOW TO MODERATE RISK FOR CAD: ICD-10-CM

## 2024-10-01 DIAGNOSIS — Z91.89 CHRONIC CHEST PAIN WITH LOW TO MODERATE RISK FOR CAD: ICD-10-CM

## 2024-10-01 DIAGNOSIS — I25.118 CORONARY ARTERY DISEASE OF NATIVE ARTERY OF NATIVE HEART WITH STABLE ANGINA PECTORIS: Primary | ICD-10-CM

## 2024-10-01 DIAGNOSIS — E78.5 DYSLIPIDEMIA: ICD-10-CM

## 2024-10-01 PROCEDURE — 99214 OFFICE O/P EST MOD 30 MIN: CPT | Performed by: INTERNAL MEDICINE

## 2024-10-01 RX ORDER — ATORVASTATIN CALCIUM 80 MG/1
80 TABLET, FILM COATED ORAL NIGHTLY
COMMUNITY
Start: 2024-07-16

## 2024-10-01 NOTE — PROGRESS NOTES
"Cardiology Clinic Note  Tim Bush MD, PhD    Subjective:     Encounter Date:10/01/2024      Patient ID: Bulmaro Reyes is a 59 y.o. male.    Chief Complaint:  Chief Complaint   Patient presents with    Follow-up       HPI:  I the pleasure to see this 59-year-old in clinic today new patient to me.  Past history of essential hypertension, alcohol abuse, hypertension hyperlipidemia, dizziness which prompted echo with normal EF no significant valvular heart disease, treadmill stress previously demonstrated no evidence of any myocardial ischemia Orrie ischemic ECG changes although blood pressure hypertensive response up to 190 systolic.  Patient reports compliance with his medicines, blood pressure controlled today, weight is stable, we discussed normal cardiac structure and function with no significant valvular disease, no evidence of obstructive CAD, no evidence of any arrhythmia although he did have heavy coronary calcification on a coronary CT however no high-grade stenosis and medical management was pursued.  We discussed high intensity statin aspirin risk factor reduction diet exercise and diabetes control today    Review of systems otherwise negative x 14 point review of systems except as mentioned above    Historical data copied forward from previous encounters in EMR including the history, exam, and assessment/plan has been reviewed and is unchanged unless noted otherwise.    Cardiac medicines reviewed with risk, benefits, and necessity of each discussed.    Risk and benefit of cardiac testing reviewed including death heart attack stroke pain bleeding infection need for vascular /cardiovascular surgery were discussed and the patient     Objective:         /82 (BP Location: Left arm, Patient Position: Sitting)   Pulse 59   Resp 18   Ht 182.9 cm (72\")   Wt 93.9 kg (207 lb)   BMI 28.07 kg/m²     Physical Exam  Regular rate and rhythm no rubs murmurs or gallops  No heave no lift  No clubbing cyanosis " or edema  Normal pulses normal cap refill  No carotid bruits or JVD    Assessment:         Nonobstructive atherosclerotic coronary disease  High risk coronary calcium score  Essential hypertension  Hyperlipidemia  Diabetes is comorbidity affecting aspects of care  Secondary prevention    Fasting lipid panel CMP magnesium UA TSH PSA and A1c to be followed in conjunction with primary care  Diet and exercise per AHA guidelines  Continue present CV medicines  Aspirin  High intensity statin atorvastatin  Lisinopril 20 daily  Metformin  Off metoprolol secondary to bradycardia heart rate 59 today with sinus rhythm  Continue fish oil    Back clinic 1 year        The pleasure to be involved in this patient's cardiovascular care.  Please call with any questions or concerns  Tim Bush MD, PhD    Most recent EKG as reviewed and interpreted by me:  Procedures     Most recent echo as reviewed and interpreted by me:  Results for orders placed during the hospital encounter of 09/29/23    Adult Transthoracic Echo Complete W/ Cont if Necessary Per Protocol    Interpretation Summary    Left ventricular systolic function is normal. Calculated left ventricular EF = 56%    Left ventricular diastolic function was normal.    The right ventricular cavity is mildly dilated.    Estimated right ventricular systolic pressure from tricuspid regurgitation is normal (<35 mmHg).      Most recent stress test as reviewed and interpreted by me:  Results for orders placed during the hospital encounter of 09/29/23    Treadmill stress test    Interpretation Summary    ECG evidence of myocardial ischemia.    Negative clinical evidence of myocardial ischemia.    Findings consistent with an abnormal ECG stress test.    Patient exercised for 7 minutes and 31 seconds achieving an estimated workload of 10.1 METS.  Peak blood pressure 196/88.  Peak heart rate of 159 bpm.      Most recent cardiac catheterization as reviewed interpreted by me:  Results for  orders placed during the hospital encounter of 03/12/24    Cardiac Catheterization/Vascular Study    Conclusion  CARDIAC CATHETERIZATION REPORT      DATE OF PROCEDURE:  3/12/2024    INDICATION FOR PROCEDURE:    Abnormal CT angiography of coronary arteries  Abnormal stress test  Hypertension  Borderline diabetes mellitus    PROCEDURE PERFORMED:    Ultrasound-guided access of femoral artery  Left heart catheterization  coronary angiography  left ventriculography    PROCEDURE COMMENTS:    After informed consent was obtained, the patient was prepped and draped in the usual sterile manner.  Mild to moderate sedation was administered.  Right femoral artery was accessed without difficulty under fluoroscopy and ultrasound guidance and 6 [place] arterial sheath was inserted.  Sheath was flushed with heparinized saline.    Using 6 [place] Eli catheters, first left coronary artery and the right coronary was electively engaged and appropriate views were taken.  A 6 [place] JR4 catheter was used to cross aortic valve and left heart catheterization was performed.  Left ventriculography was done in a GAMA view    Perclose was used to close femoral artery artery.    Patient tolerated the procedure well.    FINDINGS:    1. HEMODYNAMICS:    Aortic pressure: 124/72 mmHg    LVEDP: 0 to 5 mmHg    Gradient across aortic valve on pullback: No gradient across aortic valve      2. LEFT VENTRICULOGRAPHY: 60 to 65%      3. CORONARY ANGIOGRAPHY:    A: Left main coronary artery: Mild to moderately calcified with mild disease up to 10 to 20% in left main.    B: Left anterior descending artery: 30 to 40% stenosis at the ostium of the left anterior descending artery.  Mid LAD has 20 to 30% stenosis.  Mid to distal LAD is diffusely diseased and tapers into a small caliber vessel.    C: Left circumflex coronary artery: LCx is also mild to moderately calcified.  There is diffuse disease in mid to distal segment.  Mid obtuse marginal divides into  superior and inferior branch.  Superior branch has 40 to 50% stenosis at the ostium.    D: Right coronary artery: RCA is large and dominant and has 40 to 50% stenosis in the midsegment    SUMMARY:    Mild to moderate diffuse three-vessel coronary artery disease  Preserved left ventricular function    RECOMMENDATIONS:    Aggressive risk factor modification  Medical treatment    The following portions of the patient's history were reviewed and updated as appropriate: allergies, current medications, past family history, past medical history, past social history, past surgical history, and problem list.      ROS:  14 point review of systems negative except as mentioned above    Current Outpatient Medications:     allopurinol (ZYLOPRIM) 300 MG tablet, , Disp: , Rfl:     aspirin 81 MG EC tablet, Take 1 tablet by mouth Daily., Disp: 90 tablet, Rfl: 3    atorvastatin (LIPITOR) 80 MG tablet, 1 tablet Every Night., Disp: , Rfl:     lisinopril (PRINIVIL,ZESTRIL) 20 MG tablet, Daily., Disp: , Rfl:     metFORMIN ER (GLUCOPHAGE-XR) 500 MG 24 hr tablet, Daily With Breakfast., Disp: , Rfl:     Omega-3 Fatty Acids ([employer] Fish Oil) 1200 MG capsule, Take 2 capsules by mouth Daily., Disp: , Rfl:     Problem List:  Patient Active Problem List   Diagnosis    Abnormal stress test    Alcohol use disorder    Essential hypertension    Dyslipidemia    Coronary artery disease of native artery of native heart with stable angina pectoris     Past Medical History:  Past Medical History:   Diagnosis Date    Hyperlipidemia     Hypertension     Inguinal hernia      Past Surgical History:  Past Surgical History:   Procedure Laterality Date    CARDIAC CATHETERIZATION N/A 3/12/2024    Procedure: Left Heart Cath;  Surgeon: Jayesh Hua MD;  Location: [facility] CATH INVASIVE LOCATION;  Service: Cardiovascular;  Laterality: N/A;    HERNIA REPAIR  09/2012    becka ing/umb hernia     Social History:  Social History     Socioeconomic History    Marital status:     Tobacco Use    Smoking status: Never    Smokeless tobacco: Never   Vaping Use    Vaping status: Never Used   Substance and Sexual Activity    Alcohol use: Yes     Alcohol/week: 7.0 standard drinks of alcohol     Types: 7 Cans of beer per week     Comment: 1-2 a week    Drug use: Never    Sexual activity: Defer     Allergies:  No Known Allergies  Immunizations:    There is no immunization history on file for this patient.         In-Office Procedure(s):  No orders to display        ASCVD RIsk Score::  The ASCVD Risk score (Yen DK, et al., 2019) failed to calculate for the following reasons:    Cannot find a previous HDL lab    Cannot find a previous total cholesterol lab    Imaging:    Results for orders placed in visit on 02/29/24    IMAGING SCANNED               Lab Review:   No visits with results within 6 Month(s) from this visit.   Latest known visit with results is:   Admission on 03/12/2024, Discharged on 03/12/2024   Component Date Value    PTT 03/12/2024 27.8     Protime 03/12/2024 11.7     INR 03/12/2024 1.08     Glucose 03/12/2024 93     BUN 03/12/2024 33 (H)     Creatinine 03/12/2024 1.34 (H)     Sodium 03/12/2024 136     Potassium 03/12/2024 3.8     Chloride 03/12/2024 94 (L)     CO2 03/12/2024 26.0     Calcium 03/12/2024 10.2     BUN/Creatinine Ratio 03/12/2024 24.6     Anion Gap 03/12/2024 16.0 (H)     eGFR 03/12/2024 62.2     WBC 03/12/2024 13.00 (H)     RBC 03/12/2024 4.56     Hemoglobin 03/12/2024 13.7     Hematocrit 03/12/2024 40.6     MCV 03/12/2024 89.2     MCH 03/12/2024 30.0     MCHC 03/12/2024 33.6     RDW 03/12/2024 15.5 (H)     RDW-SD 03/12/2024 50.3     MPV 03/12/2024 8.3     Platelets 03/12/2024 380      Recent labs reviewed and interpreted for clinical significance and application            Level of Care:           Tim Bush MD  10/01/24  .

## 2024-10-04 ENCOUNTER — LAB (OUTPATIENT)
Dept: LAB | Facility: HOSPITAL | Age: 59
End: 2024-10-04
Payer: COMMERCIAL

## 2024-10-04 DIAGNOSIS — E78.5 DYSLIPIDEMIA: ICD-10-CM

## 2024-10-04 DIAGNOSIS — I10 ESSENTIAL HYPERTENSION: ICD-10-CM

## 2024-10-04 DIAGNOSIS — I25.118 CORONARY ARTERY DISEASE OF NATIVE ARTERY OF NATIVE HEART WITH STABLE ANGINA PECTORIS: ICD-10-CM

## 2024-10-04 LAB
ALBUMIN SERPL-MCNC: 4.8 G/DL (ref 3.5–5.2)
ALBUMIN/GLOB SERPL: 1.8 G/DL
ALP SERPL-CCNC: 91 U/L (ref 39–117)
ALT SERPL W P-5'-P-CCNC: 23 U/L (ref 1–41)
ANION GAP SERPL CALCULATED.3IONS-SCNC: 12 MMOL/L (ref 5–15)
AST SERPL-CCNC: 18 U/L (ref 1–40)
BACTERIA UR QL AUTO: NORMAL /HPF
BILIRUB SERPL-MCNC: 0.5 MG/DL (ref 0–1.2)
BILIRUB UR QL STRIP: NEGATIVE
BUN SERPL-MCNC: 23 MG/DL (ref 6–20)
BUN/CREAT SERPL: 23.2 (ref 7–25)
CALCIUM SPEC-SCNC: 10.3 MG/DL (ref 8.6–10.5)
CHLORIDE SERPL-SCNC: 99 MMOL/L (ref 98–107)
CHOLEST SERPL-MCNC: 110 MG/DL (ref 0–200)
CLARITY UR: CLEAR
CO2 SERPL-SCNC: 25 MMOL/L (ref 22–29)
COLOR UR: YELLOW
CREAT SERPL-MCNC: 0.99 MG/DL (ref 0.76–1.27)
EGFRCR SERPLBLD CKD-EPI 2021: 88.9 ML/MIN/1.73
GLOBULIN UR ELPH-MCNC: 2.6 GM/DL
GLUCOSE SERPL-MCNC: 88 MG/DL (ref 65–99)
GLUCOSE UR STRIP-MCNC: NEGATIVE MG/DL
HBA1C MFR BLD: 5.7 % (ref 4.8–5.6)
HDLC SERPL-MCNC: 33 MG/DL (ref 40–60)
HGB UR QL STRIP.AUTO: NEGATIVE
HYALINE CASTS UR QL AUTO: NORMAL /LPF
KETONES UR QL STRIP: NEGATIVE
LDLC SERPL CALC-MCNC: 46 MG/DL (ref 0–100)
LDLC/HDLC SERPL: 1.21 {RATIO}
LEUKOCYTE ESTERASE UR QL STRIP.AUTO: NEGATIVE
MAGNESIUM SERPL-MCNC: 1.4 MG/DL (ref 1.6–2.6)
NITRITE UR QL STRIP: NEGATIVE
PH UR STRIP.AUTO: 6 [PH] (ref 5–8)
POTASSIUM SERPL-SCNC: 3.8 MMOL/L (ref 3.5–5.2)
PROT SERPL-MCNC: 7.4 G/DL (ref 6–8.5)
PROT UR QL STRIP: NEGATIVE
PSA SERPL-MCNC: 0.58 NG/ML (ref 0–4)
RBC # UR STRIP: NORMAL /HPF
REF LAB TEST METHOD: NORMAL
SODIUM SERPL-SCNC: 136 MMOL/L (ref 136–145)
SP GR UR STRIP: 1.01 (ref 1–1.03)
SQUAMOUS #/AREA URNS HPF: NORMAL /HPF
TRIGL SERPL-MCNC: 185 MG/DL (ref 0–150)
TSH SERPL DL<=0.05 MIU/L-ACNC: 1.9 UIU/ML (ref 0.27–4.2)
UROBILINOGEN UR QL STRIP: NORMAL
VLDLC SERPL-MCNC: 31 MG/DL (ref 5–40)
WBC # UR STRIP: NORMAL /HPF

## 2024-10-04 PROCEDURE — 84443 ASSAY THYROID STIM HORMONE: CPT

## 2024-10-04 PROCEDURE — 80053 COMPREHEN METABOLIC PANEL: CPT

## 2024-10-04 PROCEDURE — 81001 URINALYSIS AUTO W/SCOPE: CPT

## 2024-10-04 PROCEDURE — 83036 HEMOGLOBIN GLYCOSYLATED A1C: CPT

## 2024-10-04 PROCEDURE — 83735 ASSAY OF MAGNESIUM: CPT

## 2024-10-04 PROCEDURE — 36415 COLL VENOUS BLD VENIPUNCTURE: CPT

## 2024-10-04 PROCEDURE — 80061 LIPID PANEL: CPT

## 2024-10-04 PROCEDURE — 84153 ASSAY OF PSA TOTAL: CPT

## (undated) DEVICE — PERCLOSE PROGLIDE™ SUTURE-MEDIATED CLOSURE SYSTEM: Brand: PERCLOSE PROGLIDE™

## (undated) DEVICE — CATH DIAG IMPULSE FR4 6F 100CM

## (undated) DEVICE — CATH DIAG IMPULSE FL4 6F 100CM

## (undated) DEVICE — PINNACLE INTRODUCER SHEATH: Brand: PINNACLE

## (undated) DEVICE — PK TRY HEART CATH 50

## (undated) DEVICE — CATH DIAG IMPULSE PIG .056 6F 110CM

## (undated) DEVICE — DGW .035 FC J3MM 150CM TEF HEP: Brand: EMERALD